# Patient Record
Sex: FEMALE | URBAN - METROPOLITAN AREA
[De-identification: names, ages, dates, MRNs, and addresses within clinical notes are randomized per-mention and may not be internally consistent; named-entity substitution may affect disease eponyms.]

---

## 2021-10-25 ENCOUNTER — INPATIENT (INPATIENT)
Facility: HOSPITAL | Age: 71
LOS: 2 days | Discharge: HOME | DRG: 287 | End: 2021-10-28
Attending: INTERNAL MEDICINE | Admitting: INTERNAL MEDICINE
Payer: MEDICARE

## 2021-10-25 VITALS
HEIGHT: 63 IN | TEMPERATURE: 98 F | RESPIRATION RATE: 18 BRPM | WEIGHT: 175.05 LBS | DIASTOLIC BLOOD PRESSURE: 71 MMHG | HEART RATE: 89 BPM | OXYGEN SATURATION: 95 % | SYSTOLIC BLOOD PRESSURE: 142 MMHG

## 2021-10-25 PROCEDURE — 93010 ELECTROCARDIOGRAM REPORT: CPT

## 2021-10-25 NOTE — ED ADULT TRIAGE NOTE - CHIEF COMPLAINT QUOTE
Had epigastric discomfort, took peptobismal with some relief but then the pain came back and she got concerened

## 2021-10-26 DIAGNOSIS — R07.9 CHEST PAIN, UNSPECIFIED: ICD-10-CM

## 2021-10-26 DIAGNOSIS — Z90.49 ACQUIRED ABSENCE OF OTHER SPECIFIED PARTS OF DIGESTIVE TRACT: Chronic | ICD-10-CM

## 2021-10-26 LAB
ALBUMIN SERPL ELPH-MCNC: 4.5 G/DL — SIGNIFICANT CHANGE UP (ref 3.3–5)
ALP SERPL-CCNC: 103 U/L — SIGNIFICANT CHANGE UP (ref 40–120)
ALT FLD-CCNC: 21 U/L — SIGNIFICANT CHANGE UP (ref 10–45)
ANION GAP SERPL CALC-SCNC: 13 MMOL/L — SIGNIFICANT CHANGE UP (ref 5–17)
AST SERPL-CCNC: 16 U/L — SIGNIFICANT CHANGE UP (ref 10–40)
BASOPHILS # BLD AUTO: 0.02 K/UL — SIGNIFICANT CHANGE UP (ref 0–0.2)
BASOPHILS NFR BLD AUTO: 0.3 % — SIGNIFICANT CHANGE UP (ref 0–2)
BILIRUB SERPL-MCNC: 0.2 MG/DL — SIGNIFICANT CHANGE UP (ref 0.2–1.2)
BUN SERPL-MCNC: 18 MG/DL — SIGNIFICANT CHANGE UP (ref 7–23)
CALCIUM SERPL-MCNC: 10.5 MG/DL — SIGNIFICANT CHANGE UP (ref 8.4–10.5)
CHLORIDE SERPL-SCNC: 102 MMOL/L — SIGNIFICANT CHANGE UP (ref 96–108)
CHOLEST SERPL-MCNC: 169 MG/DL — SIGNIFICANT CHANGE UP
CO2 SERPL-SCNC: 24 MMOL/L — SIGNIFICANT CHANGE UP (ref 22–31)
CREAT SERPL-MCNC: 0.76 MG/DL — SIGNIFICANT CHANGE UP (ref 0.5–1.3)
EOSINOPHIL # BLD AUTO: 0.06 K/UL — SIGNIFICANT CHANGE UP (ref 0–0.5)
EOSINOPHIL NFR BLD AUTO: 1 % — SIGNIFICANT CHANGE UP (ref 0–6)
GLUCOSE SERPL-MCNC: 116 MG/DL — HIGH (ref 70–99)
HCT VFR BLD CALC: 39.1 % — SIGNIFICANT CHANGE UP (ref 34.5–45)
HDLC SERPL-MCNC: 76 MG/DL — SIGNIFICANT CHANGE UP
HGB BLD-MCNC: 12.7 G/DL — SIGNIFICANT CHANGE UP (ref 11.5–15.5)
IMM GRANULOCYTES NFR BLD AUTO: 0.5 % — SIGNIFICANT CHANGE UP (ref 0–1.5)
LIPID PNL WITH DIRECT LDL SERPL: 74 MG/DL — SIGNIFICANT CHANGE UP
LYMPHOCYTES # BLD AUTO: 1.79 K/UL — SIGNIFICANT CHANGE UP (ref 1–3.3)
LYMPHOCYTES # BLD AUTO: 30.4 % — SIGNIFICANT CHANGE UP (ref 13–44)
MCHC RBC-ENTMCNC: 29 PG — SIGNIFICANT CHANGE UP (ref 27–34)
MCHC RBC-ENTMCNC: 32.5 GM/DL — SIGNIFICANT CHANGE UP (ref 32–36)
MCV RBC AUTO: 89.3 FL — SIGNIFICANT CHANGE UP (ref 80–100)
MONOCYTES # BLD AUTO: 0.5 K/UL — SIGNIFICANT CHANGE UP (ref 0–0.9)
MONOCYTES NFR BLD AUTO: 8.5 % — SIGNIFICANT CHANGE UP (ref 2–14)
NEUTROPHILS # BLD AUTO: 3.48 K/UL — SIGNIFICANT CHANGE UP (ref 1.8–7.4)
NEUTROPHILS NFR BLD AUTO: 59.3 % — SIGNIFICANT CHANGE UP (ref 43–77)
NON HDL CHOLESTEROL: 93 MG/DL — SIGNIFICANT CHANGE UP
NRBC # BLD: 0 /100 WBCS — SIGNIFICANT CHANGE UP (ref 0–0)
PLATELET # BLD AUTO: 233 K/UL — SIGNIFICANT CHANGE UP (ref 150–400)
POTASSIUM SERPL-MCNC: 4.3 MMOL/L — SIGNIFICANT CHANGE UP (ref 3.5–5.3)
POTASSIUM SERPL-SCNC: 4.3 MMOL/L — SIGNIFICANT CHANGE UP (ref 3.5–5.3)
PROT SERPL-MCNC: 7.3 G/DL — SIGNIFICANT CHANGE UP (ref 6–8.3)
RBC # BLD: 4.38 M/UL — SIGNIFICANT CHANGE UP (ref 3.8–5.2)
RBC # FLD: 13.3 % — SIGNIFICANT CHANGE UP (ref 10.3–14.5)
SARS-COV-2 RNA SPEC QL NAA+PROBE: SIGNIFICANT CHANGE UP
SODIUM SERPL-SCNC: 139 MMOL/L — SIGNIFICANT CHANGE UP (ref 135–145)
TRIGL SERPL-MCNC: 94 MG/DL — SIGNIFICANT CHANGE UP
TROPONIN T, HIGH SENSITIVITY RESULT: <6 NG/L — SIGNIFICANT CHANGE UP (ref 0–51)
TROPONIN T, HIGH SENSITIVITY RESULT: <6 NG/L — SIGNIFICANT CHANGE UP (ref 0–51)
WBC # BLD: 5.88 K/UL — SIGNIFICANT CHANGE UP (ref 3.8–10.5)
WBC # FLD AUTO: 5.88 K/UL — SIGNIFICANT CHANGE UP (ref 3.8–10.5)

## 2021-10-26 PROCEDURE — 99236 HOSP IP/OBS SAME DATE HI 85: CPT

## 2021-10-26 PROCEDURE — 71046 X-RAY EXAM CHEST 2 VIEWS: CPT | Mod: 26

## 2021-10-26 RX ORDER — ACETAMINOPHEN 500 MG
650 TABLET ORAL EVERY 6 HOURS
Refills: 0 | Status: DISCONTINUED | OUTPATIENT
Start: 2021-10-26 | End: 2021-10-28

## 2021-10-26 RX ORDER — HEPARIN SODIUM 5000 [USP'U]/ML
5000 INJECTION INTRAVENOUS; SUBCUTANEOUS EVERY 12 HOURS
Refills: 0 | Status: DISCONTINUED | OUTPATIENT
Start: 2021-10-26 | End: 2021-10-28

## 2021-10-26 RX ORDER — LISINOPRIL 2.5 MG/1
10 TABLET ORAL DAILY
Refills: 0 | Status: DISCONTINUED | OUTPATIENT
Start: 2021-10-26 | End: 2021-10-28

## 2021-10-26 RX ORDER — ASPIRIN/CALCIUM CARB/MAGNESIUM 324 MG
81 TABLET ORAL DAILY
Refills: 0 | Status: DISCONTINUED | OUTPATIENT
Start: 2021-10-26 | End: 2021-10-28

## 2021-10-26 RX ORDER — METOPROLOL TARTRATE 50 MG
25 TABLET ORAL
Refills: 0 | Status: DISCONTINUED | OUTPATIENT
Start: 2021-10-26 | End: 2021-10-28

## 2021-10-26 RX ORDER — FLUOXETINE HCL 10 MG
40 CAPSULE ORAL DAILY
Refills: 0 | Status: DISCONTINUED | OUTPATIENT
Start: 2021-10-26 | End: 2021-10-28

## 2021-10-26 RX ORDER — FAMOTIDINE 10 MG/ML
20 INJECTION INTRAVENOUS ONCE
Refills: 0 | Status: COMPLETED | OUTPATIENT
Start: 2021-10-26 | End: 2021-10-26

## 2021-10-26 RX ORDER — HYDROCHLOROTHIAZIDE 25 MG
12.5 TABLET ORAL DAILY
Refills: 0 | Status: DISCONTINUED | OUTPATIENT
Start: 2021-10-26 | End: 2021-10-28

## 2021-10-26 RX ORDER — ACETAMINOPHEN 500 MG
650 TABLET ORAL ONCE
Refills: 0 | Status: COMPLETED | OUTPATIENT
Start: 2021-10-26 | End: 2021-10-26

## 2021-10-26 RX ORDER — PANTOPRAZOLE SODIUM 20 MG/1
40 TABLET, DELAYED RELEASE ORAL
Refills: 0 | Status: DISCONTINUED | OUTPATIENT
Start: 2021-10-26 | End: 2021-10-28

## 2021-10-26 RX ORDER — SIMVASTATIN 20 MG/1
40 TABLET, FILM COATED ORAL AT BEDTIME
Refills: 0 | Status: DISCONTINUED | OUTPATIENT
Start: 2021-10-26 | End: 2021-10-28

## 2021-10-26 RX ORDER — CLONAZEPAM 1 MG
0.5 TABLET ORAL THREE TIMES A DAY
Refills: 0 | Status: DISCONTINUED | OUTPATIENT
Start: 2021-10-26 | End: 2021-10-28

## 2021-10-26 RX ADMIN — Medication 12.5 MILLIGRAM(S): at 14:03

## 2021-10-26 RX ADMIN — HEPARIN SODIUM 5000 UNIT(S): 5000 INJECTION INTRAVENOUS; SUBCUTANEOUS at 21:41

## 2021-10-26 RX ADMIN — FAMOTIDINE 20 MILLIGRAM(S): 10 INJECTION INTRAVENOUS at 01:04

## 2021-10-26 RX ADMIN — PANTOPRAZOLE SODIUM 40 MILLIGRAM(S): 20 TABLET, DELAYED RELEASE ORAL at 14:04

## 2021-10-26 RX ADMIN — SIMVASTATIN 40 MILLIGRAM(S): 20 TABLET, FILM COATED ORAL at 21:40

## 2021-10-26 RX ADMIN — Medication 0.5 MILLIGRAM(S): at 21:40

## 2021-10-26 RX ADMIN — Medication 650 MILLIGRAM(S): at 20:55

## 2021-10-26 RX ADMIN — Medication 650 MILLIGRAM(S): at 22:08

## 2021-10-26 RX ADMIN — LISINOPRIL 10 MILLIGRAM(S): 2.5 TABLET ORAL at 14:04

## 2021-10-26 RX ADMIN — Medication 40 MILLIGRAM(S): at 14:04

## 2021-10-26 NOTE — ED PROVIDER NOTE - ATTENDING CONTRIBUTION TO CARE
70 year old female with history of HLD presented to ED with chest pain radiating to the jaw. Currently chest pain free. EKG non-ischemic. Pt well appearing. Will obtain cardiac enzyme, cxr, monitor in ED. CDU for stress and echo if initial work up unrevealing.

## 2021-10-26 NOTE — ED CDU PROVIDER INITIAL DAY NOTE - PROGRESS NOTE DETAILS
CDU PROGRESS NOTE LUIS EDUARDO KINCAID: Unable to reach Cards fellow to discuss case, VM not set up yet. Will continue with ED plan for stress/TTE and discuss w/ Dr. Ellis in am. Endorsed to Dr STANISLAW Gregorio MD, Facep CDU NOTE LUIS EDUARDO Greer: pt resting comfortably, feels well without complaint. NAD VSS. no events on tele.  nuc stress- abnormal. minimal suzan-infarct ischemia. spoke with Cards Dr. Dirk Ellis- admit to Dr. Church, Dr. Perkins to be called for cards and to coordinate cath.   as per Dr. Sánchez- agree with admission CDU NOTE LUIS EDUARDO Grere: pt resting comfortably, feels well without complaint. NAD VSS. no events on tele.  spoke with Dr. Dirk Ellis- pt had Cath 2018- normal coronaries. Agrees with plan for nuc stress and echo

## 2021-10-26 NOTE — H&P ADULT - NSICDXPASTMEDICALHX_GEN_ALL_CORE_FT
PAST MEDICAL HISTORY:  Anxiety     Anxiety and depression     HLD (hyperlipidemia)     HTN (hypertension)

## 2021-10-26 NOTE — ED CDU PROVIDER INITIAL DAY NOTE - MEDICAL DECISION MAKING DETAILS
Dr. Sabra Grace: 70 year old female with history of HLD presented to ED with chest pain radiating to the jaw. Currently chest pain free. EKG non-ischemic. Pt well appearing. Initial cardiac work up unrevealing. Will place in CDU for stress and echo.

## 2021-10-26 NOTE — ED PROVIDER NOTE - PHYSICAL EXAMINATION
gen: well appearing  Mentation: AAO x 3  psych: mood appropriate  HEENT: airway patent, conjunctivae clear bilaterally  Cardio: RRR, no m/r/g  Resp: normal BS b/l  GI: soft/nondistended/nontender  : no CVA tenderness  Lymph/Vasc: no LE edema

## 2021-10-26 NOTE — H&P ADULT - ASSESSMENT
70 f with    Chest pain / abnormal stress test  - ASA  - BB  - Cardiology evaluation  - possible cath    HTN control    Depression  - continue Rx    DVT prophylaxis    Further action as per clinical course     Jayson Church MD pager [phone]

## 2021-10-26 NOTE — ED CDU PROVIDER INITIAL DAY NOTE - OBJECTIVE STATEMENT
70 year old female with a PMHx of HLD, anxiety presenting with chest pain. Began last night while lying in bed. Radiates to the jaw, intermittent, non-exertional. Associated epigastric pain. Denies SOB, nausea, vomiting, fevers. Pt states she has a Cardiologist, Dr. Dirk Ellis and had Cardiac Cath last yr that was reportedly "clean".  In ED, patient had ekg no signs of acute ischemia, troponin x 2 negative, chest x ray no signs of acute pathology. Pt sent to CDU for frequent reeval, vitals q 4hrs, telemetry monitoring TTE and stress.

## 2021-10-26 NOTE — ED ADULT NURSE NOTE - OBJECTIVE STATEMENT
71 yo F pt with PMHx of GERD p/w epigastric/diffuse abd pain over the past two nights. pt has tried Pepto bismol and Nexium with mild relief. pt endorses feeling very stressed lately. pt reports quitting smoking last year. pt reports pain radiates around her left side.  pt is A&Ox4, MAEW, skin is warm dry intact and normal for race, LS CTA, abd minimally tender to palpation in all 4 quadrants, cap refill < 2 seconds, NO JVD, no CVA tenderness.  Pt denies: headache, dizziness, palpitations, cough, SOB, n/v/d, urinary symptoms, fevers, chills, weakness at this time.

## 2021-10-26 NOTE — ED PROVIDER NOTE - OBJECTIVE STATEMENT
70 year old female with a PMHx of HLD, anxiety presenting with chest pain. Begain last night while lying in bed. Radiates to the jaw, intermittent, non-exertional. Associated epigastric pain. Denies SOB, nausea, vomiting, fevers.

## 2021-10-26 NOTE — ED CDU PROVIDER INITIAL DAY NOTE - DETAILS
CHEST PAIN  -TELE  -[facility]  -DASIA EVAL  -TTE and STRESS TEST  -CASE D/W ATTENDING

## 2021-10-26 NOTE — H&P ADULT - NSHPSOCIALHISTORY_GEN_ALL_CORE
Social History:    Marital Status:  (   )    (   ) Single    (   )    ( x )   Occupation:   Lives with: (  ) alone  ( x ) children   (  ) spouse   (  ) parents  (  ) other    Substance Use (street drugs): ( x ) never used  (  ) other:  Tobacco Usage:  (  x ) never smoked   (   ) former smoker   (   ) current smoker  (     ) pack years  (        ) last cigarette date  Alcohol Usage: denies    (     ) Advanced Directives: (     ) None    (      ) DNR    (     ) DNI    (     ) Health Care Proxy:

## 2021-10-26 NOTE — H&P ADULT - NSHPLABSRESULTS_GEN_ALL_CORE
12.7   5.88  )-----------( 233      ( 26 Oct 2021 01:07 )             39.1       10-26    139  |  102  |  18  ----------------------------<  116<H>  4.3   |  24  |  0.76    Ca    10.5      26 Oct 2021 01:07    TPro  7.3  /  Alb  4.5  /  TBili  0.2  /  DBili  x   /  AST  16  /  ALT  21  /  AlkPhos  103  10-26        < from: Transthoracic Echocardiogram (10.26.21 @ 05:33) >    ------------------------------------------------------------------------  Conclusions:  1. Normal mitral valve. Minimal mitral regurgitation.  2. Endocardium not well visualized; grossly normal left  ventricular systolic function.  3. Normal right ventricular size and function.  4. Normal tricuspid valve. Minimal tricuspid regurgitation.  *** No previous Echo exam.    < end of copied text >    IMPRESSIONS:Abnormal Study  * Chest Pain: No chest pain with administration of  Regadenoson.  * Symptom: No Symptoms.  * HR Response: Appropriate.  * BP Response: Appropriate.  * Heart Rhythm: Sinus Rhythm - 71 BPM.  * Baseline ECG: Nonspecific ST-T wave abnormality.  Poor R  wave progression.  * ECG Changes: No significant ischemic ST segment changes  beyond baseline abnormalities.  * Arrhythmia: None.  * Review of raw data shows: Breast attenuation artifact.  * The left ventricle was normal in size.  * There is a small, moderate defect in the basal septal  wall that is mostly fixed suggestive of infarct with  minimal suzan-infarct ischemia.  * There are also mild to moderate defects in the distal  anterior, distal anterolateral, apical, inferior, and  inferolateral walls that are mostly fixed, partially  correct with prone imaging, and demonstrate normal wall  motion suggestive of attenuation artifact.  * Post-stress gated wall motion analysis was performed  (LVEF = 69 %;LVEDV = 64 ml.) revealing hypokinesis of the  basal septal wall with normal overall left ventricular  ejection fraction.< from: Xray Chest 2 Views PA/Lat (10.26.21 @ 00:55) >    FINDINGS/  IMPRESSION:    Left basilar atelectasis. No pleural effusions or pneumothorax. Cardiac silhouette is unremarkable. No acute osseous pathology.    EKG SR NSST/T

## 2021-10-26 NOTE — H&P ADULT - HISTORY OF PRESENT ILLNESS
70 year old female with a PMHx of HLD, anxiety presenting with chest pain. Begain last night while lying in bed. Radiates to the jaw, intermittent, non-exertional. Associated epigastric pain. Denies SOB, nausea, vomiting, fevers. Stress test abnormal.

## 2021-10-26 NOTE — ED CDU PROVIDER DISPOSITION NOTE - NSFOLLOWUPINSTRUCTIONS_ED_ALL_ED_FT
1) Follow-up with your Primary Medical Doctor and Cardiologist. Call today / next business day for prompt follow-up.  2) Return to Emergency room for any worsening or persistent pain, weakness, fever, or any other concerning symptoms.  3) See attached instruction sheets for additional information, including information regarding signs and symptoms to look out for, reasons to seek immediate care and other important instructions.  4) Follow-up with any specialists as discussed / noted as well.

## 2021-10-26 NOTE — ED CDU PROVIDER DISPOSITION NOTE - CLINICAL COURSE
· HPI Objective Statement: 70 year old female with a PMHx of HLD, anxiety presenting with chest pain. Began last night while lying in bed. Radiates to the jaw, intermittent, non-exertional. Associated epigastric pain. Denies SOB, nausea, vomiting, fevers. Pt states she has a Cardiologist, Dr. Dirk Ellis and had Cardiac Cath last yr that was reportedly "clean".  In ED, patient had ekg no signs of acute ischemia, troponin x 2 negative, chest x ray no signs of acute pathology. Pt sent to CDU for frequent reeval, vitals q 4hrs, telemetry monitoring TTE and stress. · HPI Objective Statement: 70 year old female with a PMHx of HLD, anxiety presenting with chest pain. Began last night while lying in bed. Radiates to the jaw, intermittent, non-exertional. Associated epigastric pain. Denies SOB, nausea, vomiting, fevers. Pt states she has a Cardiologist, Dr. Dirk Ellis and had Cardiac Cath last yr that was reportedly "clean".  In ED, patient had ekg no signs of acute ischemia, troponin x 2 negative, chest x ray no signs of acute pathology. Pt sent to CDU for frequent reeval, vitals q 4hrs, telemetry monitoring TTE and stress.  in CDU, no events on tele. TTE- normal. Stress test- abnormal. +minimal suzan-infarct ischemia. spoke with Dr. Ellis- recommend admission to Dr. Church, for Dr. Perkins to be called for cards and cath tomorrow

## 2021-10-26 NOTE — ED CDU PROVIDER DISPOSITION NOTE - ATTENDING CONTRIBUTION TO CARE
Private Physician KRISTINA Miramontes cards  70y female pmh Covid Vaccine+ HTN, HLD, Dc tobacco 1y ago. 1/3 ppd, No dm, cva, coronary artery disease, cancer, Pt comes to ed  c/o chest pain. "sharp traveling pain' Off/on no constant. rad to abd. Took Pepto-bismol without relief. Now w mod improved no nvdc, gi bleeding. "feels like gas/indigestion" Had similar symptoms in past had angiogram two years ago report "ok" PE well developed and well nourished female awake alert normocephalic atraumatic neck supple chest clear anterior & posterior cv no rubs, gallops or murmurs abd soft +bs no mass guarding neruo no focal defects  Ramón Gregorio MD, Facep Private Physician KRISTINA Miramontes cards  70y female pmh Covid Vaccine+ HTN, HLD, Dc tobacco 1y ago. 1/3 ppd, No dm, cva, coronary artery disease, cancer, Pt comes to ed  c/o chest pain. "sharp traveling pain' Off/on no constant. rad to abd. Took Pepto-bismol without relief. Now w mod improved no nvdc, gi bleeding. "feels like gas/indigestion" Had similar symptoms in past had angiogram two years ago report "ok" PE well developed and well nourished female awake alert normocephalic atraumatic neck supple chest clear anterior & posterior cv no rubs, gallops or murmurs abd soft +bs no mass guarding neruo no focal defects  Ramón Gregorio MD, Facep      Nemes - 71yo F w HLD, anxiety presenting with chest pain, radiates to the jaw, intermittent, non-exertional. No other stx. In ED, patient had EKG w no signs of acute ischemia, troponin x 2 negative, chest x ray no signs of acute pathology. Observed in CDU overnight, no events.   Nuc stress- abnormal w minimal suzan-infarct ischemia. spoke with Cards Dr. Dirk Ellis, recommend admission to Dr. Church, Dr. Perkins to be called for cards and to coordinate cath.   Examined at bedside, asymptomatic, VS, exam wnl. Agree w plan/admission for further Cards eval, likely cath. Will admit

## 2021-10-26 NOTE — ED ADULT NURSE REASSESSMENT NOTE - NS ED NURSE REASSESS COMMENT FT1
07.00 Am Received the Pt from  JENNI Troy Pt is Observed for chest pain for Nuclear stress test  . Received the Pt A&OX 4 obeys commands Karla N/V/D fever chills cp SOB   Comfort care & safety measures continued  IV site looks clean & dry no signs of infiltration noted pt denies  pain IV site .  Pt is advised to call for help  call bell with in the reach pt verbalized the understanding .   pending CDU  MD sanchez . GCS 15/15 A&OX 4 PERRLA  size 3 Strong upper & lower extremities steady gait   No facial droop  No Hand Leg drop denies numbness tingling Continue to monitor. Pt is NSR on monitor

## 2021-10-26 NOTE — H&P ADULT - NSHPPHYSICALEXAM_GEN_ALL_CORE
PHYSICAL EXAMINATION:  Vital Signs Last 24 Hrs  T(C): 36.6 (26 Oct 2021 20:01), Max: 36.7 (25 Oct 2021 21:18)  T(F): 97.8 (26 Oct 2021 20:01), Max: 98.1 (25 Oct 2021 21:18)  HR: 72 (26 Oct 2021 20:01) (62 - 89)  BP: 123/66 (26 Oct 2021 20:01) (108/66 - 142/71)  BP(mean): --  RR: 18 (26 Oct 2021 20:01) (16 - 18)  SpO2: 96% (26 Oct 2021 20:01) (95% - 98%)  CAPILLARY BLOOD GLUCOSE          GENERAL: NAD, well-groomed, well-developed  HEAD:  atraumatic, normocephalic  EYES: sclera anicteric  ENMT: mucous membranes moist  NECK: supple, No JVD  CHEST/LUNG: clear to auscultation bilaterally; no rales, rhonchi, or wheezing b/l  HEART: normal S1, S2  ABDOMEN: BS+, soft, ND, NT   EXTREMITIES:  pulses palpable; no clubbing, cyanosis, or edema b/l LEs  NEURO: awake, alert, interactive; moves all extremities  SKIN: no rashes or lesions

## 2021-10-27 LAB
ANION GAP SERPL CALC-SCNC: 15 MMOL/L — SIGNIFICANT CHANGE UP (ref 5–17)
BUN SERPL-MCNC: 19 MG/DL — SIGNIFICANT CHANGE UP (ref 7–23)
CALCIUM SERPL-MCNC: 10 MG/DL — SIGNIFICANT CHANGE UP (ref 8.4–10.5)
CHLORIDE SERPL-SCNC: 103 MMOL/L — SIGNIFICANT CHANGE UP (ref 96–108)
CO2 SERPL-SCNC: 23 MMOL/L — SIGNIFICANT CHANGE UP (ref 22–31)
COVID-19 SPIKE DOMAIN AB INTERP: POSITIVE
COVID-19 SPIKE DOMAIN ANTIBODY RESULT: >250 U/ML — HIGH
CREAT SERPL-MCNC: 0.62 MG/DL — SIGNIFICANT CHANGE UP (ref 0.5–1.3)
GLUCOSE SERPL-MCNC: 101 MG/DL — HIGH (ref 70–99)
HCT VFR BLD CALC: 40.4 % — SIGNIFICANT CHANGE UP (ref 34.5–45)
HCV AB S/CO SERPL IA: 0.19 S/CO — SIGNIFICANT CHANGE UP (ref 0–0.99)
HCV AB SERPL-IMP: SIGNIFICANT CHANGE UP
HGB BLD-MCNC: 13.3 G/DL — SIGNIFICANT CHANGE UP (ref 11.5–15.5)
MCHC RBC-ENTMCNC: 29 PG — SIGNIFICANT CHANGE UP (ref 27–34)
MCHC RBC-ENTMCNC: 32.9 GM/DL — SIGNIFICANT CHANGE UP (ref 32–36)
MCV RBC AUTO: 88.2 FL — SIGNIFICANT CHANGE UP (ref 80–100)
NRBC # BLD: 0 /100 WBCS — SIGNIFICANT CHANGE UP (ref 0–0)
PLATELET # BLD AUTO: 214 K/UL — SIGNIFICANT CHANGE UP (ref 150–400)
POTASSIUM SERPL-MCNC: 3.7 MMOL/L — SIGNIFICANT CHANGE UP (ref 3.5–5.3)
POTASSIUM SERPL-SCNC: 3.7 MMOL/L — SIGNIFICANT CHANGE UP (ref 3.5–5.3)
RBC # BLD: 4.58 M/UL — SIGNIFICANT CHANGE UP (ref 3.8–5.2)
RBC # FLD: 13.4 % — SIGNIFICANT CHANGE UP (ref 10.3–14.5)
SARS-COV-2 IGG+IGM SERPL QL IA: >250 U/ML — HIGH
SARS-COV-2 IGG+IGM SERPL QL IA: POSITIVE
SODIUM SERPL-SCNC: 141 MMOL/L — SIGNIFICANT CHANGE UP (ref 135–145)
WBC # BLD: 4.72 K/UL — SIGNIFICANT CHANGE UP (ref 3.8–10.5)
WBC # FLD AUTO: 4.72 K/UL — SIGNIFICANT CHANGE UP (ref 3.8–10.5)

## 2021-10-27 RX ADMIN — Medication 25 MILLIGRAM(S): at 05:19

## 2021-10-27 RX ADMIN — SIMVASTATIN 40 MILLIGRAM(S): 20 TABLET, FILM COATED ORAL at 23:06

## 2021-10-27 RX ADMIN — HEPARIN SODIUM 5000 UNIT(S): 5000 INJECTION INTRAVENOUS; SUBCUTANEOUS at 05:20

## 2021-10-27 RX ADMIN — Medication 650 MILLIGRAM(S): at 11:34

## 2021-10-27 RX ADMIN — HEPARIN SODIUM 5000 UNIT(S): 5000 INJECTION INTRAVENOUS; SUBCUTANEOUS at 17:35

## 2021-10-27 RX ADMIN — Medication 12.5 MILLIGRAM(S): at 05:19

## 2021-10-27 RX ADMIN — Medication 81 MILLIGRAM(S): at 11:03

## 2021-10-27 RX ADMIN — Medication 0.5 MILLIGRAM(S): at 23:06

## 2021-10-27 RX ADMIN — Medication 40 MILLIGRAM(S): at 11:04

## 2021-10-27 RX ADMIN — PANTOPRAZOLE SODIUM 40 MILLIGRAM(S): 20 TABLET, DELAYED RELEASE ORAL at 05:19

## 2021-10-27 RX ADMIN — LISINOPRIL 10 MILLIGRAM(S): 2.5 TABLET ORAL at 05:19

## 2021-10-27 RX ADMIN — Medication 650 MILLIGRAM(S): at 11:04

## 2021-10-27 RX ADMIN — Medication 25 MILLIGRAM(S): at 17:35

## 2021-10-27 NOTE — CONSULT NOTE ADULT - SUBJECTIVE AND OBJECTIVE BOX
CHIEF COMPLAINT:Patient is a 70y old  Female who presents with a chief complaint of     HISTORY OF PRESENT ILLNESS:    70 female with history as below, presents with chest pain intermittent   had abnormal stress test  no sob  no dizziness  no syncope   no fever  no chills  no palpitation       PAST MEDICAL & SURGICAL HISTORY:  HLD (hyperlipidemia)    Anxiety    Anxiety and depression    HTN (hypertension)    History of cholecystectomy            MEDICATIONS:  aspirin enteric coated 81 milliGRAM(s) Oral daily  heparin   Injectable 5000 Unit(s) SubCutaneous every 12 hours  hydrochlorothiazide 12.5 milliGRAM(s) Oral daily  lisinopril 10 milliGRAM(s) Oral daily  metoprolol tartrate 25 milliGRAM(s) Oral two times a day        acetaminophen     Tablet .. 650 milliGRAM(s) Oral every 6 hours PRN  clonazePAM  Tablet 0.5 milliGRAM(s) Oral three times a day  FLUoxetine 40 milliGRAM(s) Oral daily    pantoprazole    Tablet 40 milliGRAM(s) Oral before breakfast    simvastatin 40 milliGRAM(s) Oral at bedtime        FAMILY HISTORY:  No pertinent family history in first degree relatives        Non-contributory    SOCIAL HISTORY:    No tobacco, drugs or etoh    Allergies    codeine (Unknown)    Intolerances    	    REVIEW OF SYSTEMS:  as above  The rest of the 14 points ROS reviewed and except above they are unremarkable.        PHYSICAL EXAM:  T(C): 36.4 (10-27-21 @ 15:56), Max: 36.7 (10-26-21 @ 19:16)  HR: 61 (10-27-21 @ 15:56) (60 - 72)  BP: 108/64 (10-27-21 @ 15:56) (100/68 - 126/79)  RR: 18 (10-27-21 @ 15:56) (18 - 18)  SpO2: 94% (10-27-21 @ 15:56) (92% - 96%)  Wt(kg): --  I&O's Summary    27 Oct 2021 07:01  -  27 Oct 2021 16:13  --------------------------------------------------------  IN: 360 mL / OUT: 0 mL / NET: 360 mL        JVP: Normal  Neck: supple  Lung: clear   CV: S1 S2 , Murmur:  Abd: soft  Ext: No edema  neuro: Awake / alert  Psych: flat affect  Skin: normal      LABS/DATA:    TELEMETRY: 	  sinus   ECG:  	   	  CARDIAC MARKERS:                        <6 <<== 10-26-21 @ 03:56                <6 <<== 10-26-21 @ 01:07        < from: Transthoracic Echocardiogram (10.26.21 @ 05:33) >  -------------------------------  Conclusions:  1. Normal mitral valve. Minimal mitral regurgitation.  2. Endocardium not well visualized; grossly normal left  ventricular systolic function.  3. Normal right ventricular size and function.  4. Normal tricuspid valve. Minimal tricuspid regurgitation.  *** No previous Echo exam.  ------------------------------------------------------------------------  Confirmed on  10/26/2021 - 14:43:51 by SEDRICK Alba    < end of copied text >  < from: Nuclear Stress Test-Pharmacologic (10.26.21 @ 16:25) >  IMPRESSIONS:Abnormal Study  * Chest Pain: No chest pain with administration of  Regadenoson.  * Symptom: No Symptoms.  * HR Response: Appropriate.  * BP Response: Appropriate.  * Heart Rhythm: Sinus Rhythm - 71 BPM.  * Baseline ECG: Nonspecific ST-T wave abnormality.  Poor R  wave progression.  * ECG Changes: No significant ischemic ST segment changes  beyond baseline abnormalities.  * Arrhythmia: None.  * Review of raw data shows: Breast attenuation artifact.  * The left ventricle was normal in size.  * There is a small, moderate defect in the basal septal  wall that is mostly fixed suggestive of infarct with  minimal suzan-infarct ischemia.  * There are also mild to moderate defects in the distal  anterior, distal anterolateral, apical, inferior, and  inferolateral walls that are mostly fixed, partially  correct with prone imaging, and demonstrate normal wall  motion suggestive of attenuation artifact.  * Post-stress gated wall motion analysis was performed  (LVEF = 69 %;LVEDV = 64 ml.) revealing hypokinesis of the  basal septal wall with normal overall left ventricular  ejection fraction.  *** No previous Nuclear/Stress exam.  ------------------------------------------------------------------------  Confirmed on  10/26/2021 - 17:17:27 by Eugene Hawkins M.D.    < end of copied text >                        13.3   4.72  )-----------( 214      ( 27 Oct 2021 07:10 )             40.4     10-27    141  |  103  |  19  ----------------------------<  101<H>  3.7   |  23  |  0.62    Ca    10.0      27 Oct 2021 07:10    TPro  7.3  /  Alb  4.5  /  TBili  0.2  /  DBili  x   /  AST  16  /  ALT  21  /  AlkPhos  103  10-26    proBNP: Serum Pro-Brain Natriuretic Peptide: 141 pg/mL (10-26 @ 01:07)    Lipid Profile:   HgA1c:   TSH:

## 2021-10-27 NOTE — CONSULT NOTE ADULT - ASSESSMENT
Chest pain   abnormal stress test   ruled out for acute MI   will plan for cath tomorrow    HTN  cont current meds     HTN  on statin      Advanced care planning was discussed with patient and family.  Risks, benefits and alternatives of the cardiac treatments and medical therapy including procedures were discussed in detail and all questions were answered. Importance of compliance with medical therapy and lifestyle modification to improve cardiovascular health were addressed. Appropriate forms and patient educational materials were reviewed. 30 minutes face to face spent.

## 2021-10-27 NOTE — PROGRESS NOTE ADULT - ASSESSMENT
70 f with    Chest pain / abnormal stress test  - ASA  - BB  - Cardiology evaluation noted  - cath pending     HTN control    Depression  - continue Rx    DVT prophylaxis    Jayson Church MD pager [phone]

## 2021-10-28 VITALS
SYSTOLIC BLOOD PRESSURE: 100 MMHG | OXYGEN SATURATION: 97 % | DIASTOLIC BLOOD PRESSURE: 63 MMHG | HEART RATE: 73 BPM | RESPIRATION RATE: 16 BRPM | TEMPERATURE: 98 F

## 2021-10-28 LAB
ANION GAP SERPL CALC-SCNC: 14 MMOL/L — SIGNIFICANT CHANGE UP (ref 5–17)
BUN SERPL-MCNC: 20 MG/DL — SIGNIFICANT CHANGE UP (ref 7–23)
CALCIUM SERPL-MCNC: 9.7 MG/DL — SIGNIFICANT CHANGE UP (ref 8.4–10.5)
CHLORIDE SERPL-SCNC: 102 MMOL/L — SIGNIFICANT CHANGE UP (ref 96–108)
CO2 SERPL-SCNC: 23 MMOL/L — SIGNIFICANT CHANGE UP (ref 22–31)
CREAT SERPL-MCNC: 0.73 MG/DL — SIGNIFICANT CHANGE UP (ref 0.5–1.3)
GLUCOSE SERPL-MCNC: 99 MG/DL — SIGNIFICANT CHANGE UP (ref 70–99)
HCT VFR BLD CALC: 40.6 % — SIGNIFICANT CHANGE UP (ref 34.5–45)
HGB BLD-MCNC: 13.3 G/DL — SIGNIFICANT CHANGE UP (ref 11.5–15.5)
MAGNESIUM SERPL-MCNC: 1.9 MG/DL — SIGNIFICANT CHANGE UP (ref 1.6–2.6)
MCHC RBC-ENTMCNC: 28.9 PG — SIGNIFICANT CHANGE UP (ref 27–34)
MCHC RBC-ENTMCNC: 32.8 GM/DL — SIGNIFICANT CHANGE UP (ref 32–36)
MCV RBC AUTO: 88.3 FL — SIGNIFICANT CHANGE UP (ref 80–100)
NRBC # BLD: 0 /100 WBCS — SIGNIFICANT CHANGE UP (ref 0–0)
PHOSPHATE SERPL-MCNC: 3.4 MG/DL — SIGNIFICANT CHANGE UP (ref 2.5–4.5)
PLATELET # BLD AUTO: 205 K/UL — SIGNIFICANT CHANGE UP (ref 150–400)
POTASSIUM SERPL-MCNC: 3.8 MMOL/L — SIGNIFICANT CHANGE UP (ref 3.5–5.3)
POTASSIUM SERPL-SCNC: 3.8 MMOL/L — SIGNIFICANT CHANGE UP (ref 3.5–5.3)
RBC # BLD: 4.6 M/UL — SIGNIFICANT CHANGE UP (ref 3.8–5.2)
RBC # FLD: 13.3 % — SIGNIFICANT CHANGE UP (ref 10.3–14.5)
SODIUM SERPL-SCNC: 139 MMOL/L — SIGNIFICANT CHANGE UP (ref 135–145)
WBC # BLD: 4.86 K/UL — SIGNIFICANT CHANGE UP (ref 3.8–10.5)
WBC # FLD AUTO: 4.86 K/UL — SIGNIFICANT CHANGE UP (ref 3.8–10.5)

## 2021-10-28 PROCEDURE — A9500: CPT

## 2021-10-28 PROCEDURE — 99152 MOD SED SAME PHYS/QHP 5/>YRS: CPT

## 2021-10-28 PROCEDURE — C1887: CPT

## 2021-10-28 PROCEDURE — C1894: CPT

## 2021-10-28 PROCEDURE — C1769: CPT

## 2021-10-28 PROCEDURE — 99285 EMERGENCY DEPT VISIT HI MDM: CPT

## 2021-10-28 PROCEDURE — 85027 COMPLETE CBC AUTOMATED: CPT

## 2021-10-28 PROCEDURE — 83036 HEMOGLOBIN GLYCOSYLATED A1C: CPT

## 2021-10-28 PROCEDURE — U0005: CPT

## 2021-10-28 PROCEDURE — 83690 ASSAY OF LIPASE: CPT

## 2021-10-28 PROCEDURE — 83880 ASSAY OF NATRIURETIC PEPTIDE: CPT

## 2021-10-28 PROCEDURE — 84100 ASSAY OF PHOSPHORUS: CPT

## 2021-10-28 PROCEDURE — 93306 TTE W/DOPPLER COMPLETE: CPT

## 2021-10-28 PROCEDURE — 86769 SARS-COV-2 COVID-19 ANTIBODY: CPT

## 2021-10-28 PROCEDURE — 93454 CORONARY ARTERY ANGIO S&I: CPT | Mod: 26

## 2021-10-28 PROCEDURE — 83735 ASSAY OF MAGNESIUM: CPT

## 2021-10-28 PROCEDURE — U0003: CPT

## 2021-10-28 PROCEDURE — 93017 CV STRESS TEST TRACING ONLY: CPT

## 2021-10-28 PROCEDURE — 93005 ELECTROCARDIOGRAM TRACING: CPT

## 2021-10-28 PROCEDURE — 78452 HT MUSCLE IMAGE SPECT MULT: CPT | Mod: MA

## 2021-10-28 PROCEDURE — 86803 HEPATITIS C AB TEST: CPT

## 2021-10-28 PROCEDURE — 80048 BASIC METABOLIC PNL TOTAL CA: CPT

## 2021-10-28 PROCEDURE — 96374 THER/PROPH/DIAG INJ IV PUSH: CPT

## 2021-10-28 PROCEDURE — 84484 ASSAY OF TROPONIN QUANT: CPT

## 2021-10-28 PROCEDURE — 80061 LIPID PANEL: CPT

## 2021-10-28 PROCEDURE — G0378: CPT

## 2021-10-28 PROCEDURE — 80053 COMPREHEN METABOLIC PANEL: CPT

## 2021-10-28 PROCEDURE — 85025 COMPLETE CBC W/AUTO DIFF WBC: CPT

## 2021-10-28 PROCEDURE — 71046 X-RAY EXAM CHEST 2 VIEWS: CPT

## 2021-10-28 PROCEDURE — 93454 CORONARY ARTERY ANGIO S&I: CPT

## 2021-10-28 RX ORDER — METOPROLOL TARTRATE 50 MG
1 TABLET ORAL
Qty: 60 | Refills: 0
Start: 2021-10-28 | End: 2021-11-26

## 2021-10-28 RX ORDER — ASPIRIN/CALCIUM CARB/MAGNESIUM 324 MG
1 TABLET ORAL
Qty: 30 | Refills: 0
Start: 2021-10-28 | End: 2021-11-26

## 2021-10-28 RX ADMIN — Medication 40 MILLIGRAM(S): at 14:24

## 2021-10-28 RX ADMIN — HEPARIN SODIUM 5000 UNIT(S): 5000 INJECTION INTRAVENOUS; SUBCUTANEOUS at 06:03

## 2021-10-28 RX ADMIN — Medication 12.5 MILLIGRAM(S): at 06:01

## 2021-10-28 RX ADMIN — Medication 81 MILLIGRAM(S): at 09:12

## 2021-10-28 RX ADMIN — PANTOPRAZOLE SODIUM 40 MILLIGRAM(S): 20 TABLET, DELAYED RELEASE ORAL at 06:02

## 2021-10-28 RX ADMIN — Medication 25 MILLIGRAM(S): at 06:02

## 2021-10-28 RX ADMIN — LISINOPRIL 10 MILLIGRAM(S): 2.5 TABLET ORAL at 06:01

## 2021-10-28 NOTE — DISCHARGE NOTE PROVIDER - NSDCMRMEDTOKEN_GEN_ALL_CORE_FT
aspirin 81 mg oral delayed release tablet: 1 tab(s) orally once a day  clonazePAM 0.5 mg oral tablet: 1 tab(s) orally 3 times a day, As Needed  FLUoxetine 40 mg oral capsule: 1 cap(s) orally once a day  lisinopril-hydrochlorothiazide 10 mg-12.5 mg oral tablet: 1 tab(s) orally once a day  metoprolol tartrate 25 mg oral tablet: 1 tab(s) orally 2 times a day  omeprazole 40 mg oral delayed release capsule: 1 cap(s) orally once a day  simvastatin 40 mg oral tablet: 1 tab(s) orally once a day (at bedtime)  Tylenol 325 mg oral tablet: 1 tab(s) orally , As Needed

## 2021-10-28 NOTE — PROGRESS NOTE ADULT - ASSESSMENT
70 f with    Chest pain / abnormal stress test  - ASA  - BB  - Cardiology follow. Cleared for DC  - cath nonobstructive.    HTN control    Depression  - continue Rx    DVT prophylaxis    DC home. Follow with PMD/ Cardiology in 3-4 days. QA    Jayson Church MD pager [phone]

## 2021-10-28 NOTE — PROGRESS NOTE ADULT - ASSESSMENT
Chest pain   abnormal stress test   ruled out for acute MI   cath non obstructive     HTN  cont current meds     HTN  on statin      Advanced care planning was discussed with patient and family.  Risks, benefits and alternatives of the cardiac treatments and medical therapy including procedures were discussed in detail and all questions were answered. Importance of compliance with medical therapy and lifestyle modification to improve cardiovascular health were addressed. Appropriate forms and patient educational materials were reviewed. 30 minutes face to face spent.

## 2021-10-28 NOTE — DISCHARGE NOTE PROVIDER - NSDCCPCAREPLAN_GEN_ALL_CORE_FT
PRINCIPAL DISCHARGE DIAGNOSIS  Diagnosis: Chest pain  Assessment and Plan of Treatment: Improved.  Cardiac cath with normal coronaries.  Follow up with your PCP within one week of discharge to discuss your admission.  Please call to schedule your appointment.      SECONDARY DISCHARGE DIAGNOSES  Diagnosis: Hypertension  Assessment and Plan of Treatment: Take your BP medications as prescribed.    Diagnosis: Anxiety with depression  Assessment and Plan of Treatment: Take your medications as prescribed.

## 2021-10-28 NOTE — PROGRESS NOTE ADULT - SUBJECTIVE AND OBJECTIVE BOX
Patient is a 70y old  Female who presents with a chief complaint of     SUBJECTIVE / OVERNIGHT EVENTS: Comfortable without new complaints.   Review of Systems  chest pain no  palpitations no  sob no  nausea no  headache no    MEDICATIONS  (STANDING):  aspirin enteric coated 81 milliGRAM(s) Oral daily  clonazePAM  Tablet 0.5 milliGRAM(s) Oral three times a day  FLUoxetine 40 milliGRAM(s) Oral daily  heparin   Injectable 5000 Unit(s) SubCutaneous every 12 hours  hydrochlorothiazide 12.5 milliGRAM(s) Oral daily  lisinopril 10 milliGRAM(s) Oral daily  metoprolol tartrate 25 milliGRAM(s) Oral two times a day  pantoprazole    Tablet 40 milliGRAM(s) Oral before breakfast  simvastatin 40 milliGRAM(s) Oral at bedtime    MEDICATIONS  (PRN):  acetaminophen     Tablet .. 650 milliGRAM(s) Oral every 6 hours PRN Temp greater or equal to 38.5C (101.3F), Mild Pain (1 - 3)      Vital Signs Last 24 Hrs  T(C): 36.4 (27 Oct 2021 15:56), Max: 36.7 (27 Oct 2021 04:34)  T(F): 97.5 (27 Oct 2021 15:56), Max: 98.1 (27 Oct 2021 04:34)  HR: 74 (27 Oct 2021 17:30) (60 - 74)  BP: 110/65 (27 Oct 2021 17:30) (100/68 - 126/79)  BP(mean): --  RR: 18 (27 Oct 2021 15:56) (18 - 18)  SpO2: 94% (27 Oct 2021 15:56) (92% - 94%)    PHYSICAL EXAM:  GENERAL: NAD, well-developed  HEAD:  Atraumatic, Normocephalic  EYES: EOMI, PERRLA, conjunctiva and sclera clear  NECK: Supple, No JVD  CHEST/LUNG: Clear to auscultation bilaterally; No wheeze  HEART: Regular rate and rhythm; No murmurs, rubs, or gallops  ABDOMEN: Soft, Nontender, Nondistended; Bowel sounds present  EXTREMITIES:  2+ Peripheral Pulses, No clubbing, cyanosis, or edema  PSYCH: AAOx3  NEUROLOGY: non-focal  SKIN: No rashes or lesions    LABS:                        13.3   4.72  )-----------( 214      ( 27 Oct 2021 07:10 )             40.4     10-27    141  |  103  |  19  ----------------------------<  101<H>  3.7   |  23  |  0.62    Ca    10.0      27 Oct 2021 07:10    TPro  7.3  /  Alb  4.5  /  TBili  0.2  /  DBili  x   /  AST  16  /  ALT  21  /  AlkPhos  103  10-26                RADIOLOGY & ADDITIONAL TESTS:    Imaging Personally Reviewed:    Consultant(s) Notes Reviewed:      Care Discussed with Consultants/Other Providers:  
DATE OF SERVICE: 10-28-21 @ 10:23    Subjective: Patient seen and examined. No new events except as noted.     SUBJECTIVE/ROS:  No chest pain, dyspnea, palpitation, or dizziness.       MEDICATIONS:  MEDICATIONS  (STANDING):  aspirin enteric coated 81 milliGRAM(s) Oral daily  clonazePAM  Tablet 0.5 milliGRAM(s) Oral three times a day  FLUoxetine 40 milliGRAM(s) Oral daily  heparin   Injectable 5000 Unit(s) SubCutaneous every 12 hours  hydrochlorothiazide 12.5 milliGRAM(s) Oral daily  lisinopril 10 milliGRAM(s) Oral daily  metoprolol tartrate 25 milliGRAM(s) Oral two times a day  pantoprazole    Tablet 40 milliGRAM(s) Oral before breakfast  simvastatin 40 milliGRAM(s) Oral at bedtime      PHYSICAL EXAM:  T(C): 36.3 (10-28-21 @ 10:10), Max: 36.6 (10-28-21 @ 00:35)  HR: 63 (10-28-21 @ 10:10) (56 - 74)  BP: 96/77 (10-28-21 @ 10:10) (96/77 - 114/70)  RR: 16 (10-28-21 @ 10:10) (16 - 18)  SpO2: 96% (10-28-21 @ 10:10) (94% - 96%)  Wt(kg): --  I&O's Summary    27 Oct 2021 07:01  -  28 Oct 2021 07:00  --------------------------------------------------------  IN: 360 mL / OUT: 0 mL / NET: 360 mL            JVP: Normal  Neck: supple  Lung: clear   CV: S1 S2 , Murmur:  Abd: soft  Ext: No edema  neuro: Awake / alert  Psych: flat affect  Skin: normal``    LABS/DATA:    CARDIAC MARKERS:                                13.3   4.86  )-----------( 205      ( 28 Oct 2021 06:57 )             40.6     10-28    139  |  102  |  20  ----------------------------<  99  3.8   |  23  |  0.73    Ca    9.7      28 Oct 2021 06:57  Phos  3.4     10-28  Mg     1.9     10-28      proBNP:   Lipid Profile:   HgA1c:   TSH:     TELE:  EKG:        
Patient is a 70y old  Female who presents with a chief complaint of Chest pain (28 Oct 2021 14:07)      SUBJECTIVE / OVERNIGHT EVENTS: Comfortable without new complaints.   Review of Systems  chest pain no  palpitations no  sob no  nausea no  headache no    MEDICATIONS  (STANDING):  aspirin enteric coated 81 milliGRAM(s) Oral daily  clonazePAM  Tablet 0.5 milliGRAM(s) Oral three times a day  FLUoxetine 40 milliGRAM(s) Oral daily  heparin   Injectable 5000 Unit(s) SubCutaneous every 12 hours  hydrochlorothiazide 12.5 milliGRAM(s) Oral daily  lisinopril 10 milliGRAM(s) Oral daily  metoprolol tartrate 25 milliGRAM(s) Oral two times a day  pantoprazole    Tablet 40 milliGRAM(s) Oral before breakfast  simvastatin 40 milliGRAM(s) Oral at bedtime    MEDICATIONS  (PRN):  acetaminophen     Tablet .. 650 milliGRAM(s) Oral every 6 hours PRN Temp greater or equal to 38.5C (101.3F), Mild Pain (1 - 3)      Vital Signs Last 24 Hrs  T(C): 36.4 (28 Oct 2021 14:57), Max: 36.6 (28 Oct 2021 00:35)  T(F): 97.5 (28 Oct 2021 14:57), Max: 97.8 (28 Oct 2021 00:35)  HR: 73 (28 Oct 2021 14:57) (56 - 73)  BP: 100/63 (28 Oct 2021 14:57) (93/58 - 114/70)  BP(mean): 61 (28 Oct 2021 12:10) (61 - 81)  RR: 16 (28 Oct 2021 14:57) (15 - 18)  SpO2: 97% (28 Oct 2021 14:57) (93% - 99%)    PHYSICAL EXAM:  GENERAL: NAD, well-developed  HEAD:  Atraumatic, Normocephalic  EYES: EOMI, PERRLA, conjunctiva and sclera clear  NECK: Supple, No JVD  CHEST/LUNG: Clear to auscultation bilaterally; No wheeze  HEART: Regular rate and rhythm; No murmurs, rubs, or gallops  ABDOMEN: Soft, Nontender, Nondistended; Bowel sounds present  EXTREMITIES:  2+ Peripheral Pulses, No clubbing, cyanosis, or edema  PSYCH: AAOx3  NEUROLOGY: non-focal  SKIN: No rashes or lesions    LABS:                        13.3   4.86  )-----------( 205      ( 28 Oct 2021 06:57 )             40.6     10-28    139  |  102  |  20  ----------------------------<  99  3.8   |  23  |  0.73    Ca    9.7      28 Oct 2021 06:57  Phos  3.4     10-28  Mg     1.9     10-28          Cath nonobstructive         RADIOLOGY & ADDITIONAL TESTS:    Imaging Personally Reviewed:    Consultant(s) Notes Reviewed:      Care Discussed with Consultants/Other Providers:

## 2021-10-28 NOTE — PROVIDER CONTACT NOTE (CHANGE IN STATUS NOTIFICATION) - SITUATION
Patient refusing 0600 Klonopin PO.  Patient educated on the benefits of medication.  PA made aware.  Dose wasted with RN witness in Pyxis.

## 2021-10-28 NOTE — DISCHARGE NOTE PROVIDER - CARE PROVIDER_API CALL
BERNADETTE NOLAND  Cardiovascular Diseases  [address] [place] [address] [address]  [place] [address]  Phone: [phone]  Fax: [phone]  Follow Up Time:

## 2021-10-28 NOTE — DISCHARGE NOTE PROVIDER - HOSPITAL COURSE
70 f with PMH of HLD and anxiety present with Chest pain.  Underwent stress testing which was abnormal.  She is now s/p cardiac cath on 10/28/21 with normal coronaries.  Medically cleared for d/c home with follow up to PCP.

## 2021-10-28 NOTE — DISCHARGE NOTE NURSING/CASE MANAGEMENT/SOCIAL WORK - NSTOBACCONEVERSMOKERY/N_GEN_A
Finished the antibiotic, all symptoms resolved except still having painful urination.  Wondering what you recommend next?    Yes

## 2021-10-29 RX ORDER — PREGABALIN 225 MG/1
1 CAPSULE ORAL
Qty: 0 | Refills: 0 | DISCHARGE

## 2021-10-29 RX ORDER — CHOLECALCIFEROL (VITAMIN D3) 125 MCG
1 CAPSULE ORAL
Qty: 0 | Refills: 0 | DISCHARGE

## 2021-10-29 RX ORDER — SIMVASTATIN 20 MG/1
1 TABLET, FILM COATED ORAL
Qty: 0 | Refills: 0 | DISCHARGE

## 2021-10-29 RX ORDER — ACETAMINOPHEN 500 MG
1 TABLET ORAL
Qty: 0 | Refills: 0 | DISCHARGE

## 2021-10-29 RX ORDER — LISINOPRIL/HYDROCHLOROTHIAZIDE 10-12.5 MG
1 TABLET ORAL
Qty: 0 | Refills: 0 | DISCHARGE

## 2021-10-29 RX ORDER — FLUOXETINE HCL 10 MG
1 CAPSULE ORAL
Qty: 0 | Refills: 0 | DISCHARGE

## 2021-10-29 RX ORDER — CLONAZEPAM 1 MG
1 TABLET ORAL
Qty: 0 | Refills: 0 | DISCHARGE

## 2021-10-29 RX ORDER — OMEPRAZOLE 10 MG/1
1 CAPSULE, DELAYED RELEASE ORAL
Qty: 0 | Refills: 0 | DISCHARGE

## 2021-12-07 NOTE — DISCHARGE NOTE NURSING/CASE MANAGEMENT/SOCIAL WORK - PATIENT PORTAL LINK FT
Yes
You can access the FollowMyHealth Patient Portal offered by [facility] by registering at the following website: [email or web address]. By joining [employer]’s FollowMyHealth portal, you will also be able to view your health information using other applications (apps) compatible with our system.

## 2022-06-09 NOTE — ED PROVIDER NOTE - WR ORDER DATE AND TIME 1
Form has been received.
[employer] calling to see if form was received for diabetic supplies. Says they sent last week and again today.
26-Oct-2021 00:44

## 2022-09-01 PROBLEM — E78.5 HYPERLIPIDEMIA, UNSPECIFIED: Chronic | Status: ACTIVE | Noted: 2021-10-26

## 2022-09-01 PROBLEM — F41.9 ANXIETY DISORDER, UNSPECIFIED: Chronic | Status: ACTIVE | Noted: 2021-10-26

## 2022-09-01 PROBLEM — I10 ESSENTIAL (PRIMARY) HYPERTENSION: Chronic | Status: ACTIVE | Noted: 2021-10-26

## 2022-09-09 PROBLEM — Z00.00 ENCOUNTER FOR PREVENTIVE HEALTH EXAMINATION: Status: ACTIVE | Noted: 2022-09-09

## 2022-09-18 ENCOUNTER — NON-APPOINTMENT (OUTPATIENT)
Age: 72
End: 2022-09-18

## 2022-09-19 ENCOUNTER — APPOINTMENT (OUTPATIENT)
Dept: VASCULAR SURGERY | Facility: CLINIC | Age: 72
End: 2022-09-19

## 2022-09-19 ENCOUNTER — NON-APPOINTMENT (OUTPATIENT)
Age: 72
End: 2022-09-19

## 2022-09-19 VITALS
HEART RATE: 80 BPM | SYSTOLIC BLOOD PRESSURE: 122 MMHG | BODY MASS INDEX: 32.2 KG/M2 | WEIGHT: 175 LBS | DIASTOLIC BLOOD PRESSURE: 79 MMHG | TEMPERATURE: 98 F | HEIGHT: 62 IN

## 2022-09-19 PROCEDURE — 99204 OFFICE O/P NEW MOD 45 MIN: CPT

## 2022-09-19 PROCEDURE — 93970 EXTREMITY STUDY: CPT

## 2022-09-19 NOTE — DATA REVIEWED
[FreeTextEntry1] : 9/19/2022 Venous Doppler Benigno LE  no acute dvt svt \par                            RLE GSV insuff from sfj to knee  level sig for small caliber \par                            LLE GSV insuff from sfj  to distal calf level w insuff thigh and calf collaterals\par \par

## 2022-09-19 NOTE — ASSESSMENT
[FreeTextEntry1] : Impression  symptomatic venous insuff \par \par \par Plan Med Conservative management leg elevation, knee high compression stockings 15-20mm Hg (rx given), wt loss, diet control, exercise program, protective measures\par Indications risks and benefits of  LLE GSV  RFA , Benigno leg stab phlebectomy were explained to pt who understands\par Pt wants to think about it and decide\par telehealth visit in 2-3 mo to re eval \par \par \par \par Varicose veins are enlarged, twisted veins. Varicose veins are caused by increased blood pressure in the veins.  The blood moves towards the heart by 1-way valves in the veins. When the valves become weakened or damaged, blood can collect in the veins and pool in your lower legs (ankles). This causes the veins to become enlarged and incompetent with reflux. Sitting or standing for long periods can cause blood to pool in the leg veins, increasing the pressure within the veins. \par Risk factors for varicose veins or venous disease may include:  obesity, older age, standing or sitting for prolonged periods of time for several years, being female, pregnancy, taking oral contraceptive pills or hormone replacement, being inactive, and/or smoking. \par The most common symptoms of varicose veins are sensations in the legs, such as a heavy feeling, burning, and/or aching. However, each individual may experience symptoms differently.  Other symptoms may include:  color changes in the skin, sores on the legs, or rash.  Severe varicose veins or venous disease may eventually produce long-term mild swelling that can result in more serious skin and tissue problems, such as ulcers and non-healing sores.\par Varicose veins and venous disease are diagnosed by a complete medical history, physical examination, and diagnostic studies for varicose veins including duplex ultrasound and color-flow imaging.  \par Medical treatment for varicose veins and venous disease include:  compression stockings, sclerotherapy, endovenous ablation and/or surgical treatment with microphlebectomy.  \par \par \par  [Arterial/Venous Disease] : arterial/venous disease [Other: _____] : [unfilled]

## 2022-09-19 NOTE — PHYSICAL EXAM
[Normal Breath Sounds] : Normal breath sounds [1+] : left 1+ [2+] : left 2+ [Alert] : alert [Oriented to Person] : oriented to person [Oriented to Place] : oriented to place [Oriented to Time] : oriented to time [Calm] : calm [JVD] : no jugular venous distention  [0] : right 0 [Abdomen Masses] : No abdominal masses [de-identified] : nad [de-identified] : wnl [FreeTextEntry1] : Moderate bilateral leg venous insufficiency \par w moderate bilateral leg stasis dermatitis, hyperpigmentation in the gator area,hyperkeratosis (skin thickening)\par and mild to moderate bilateral leg edema \par Multiple  bilateral leg small to medium varicose  veins and spider veins  ant post medial calf and shin \par RLE Varicose veins measuring 1-3  mm in size on the calf /shin \par LLE Varicose veins measuring  1-6 mm in size on the sig on calf /shin \par no wounds/ulcers\par  [de-identified] : wnl [de-identified] : Benigno Cranial nerves 2-12 benigno grossly intact [de-identified] : cooperative

## 2022-09-19 NOTE — HISTORY OF PRESENT ILLNESS
[FreeTextEntry1] : Pt c/o bilateral leg pain swelling heaviness and discomfort worse w activity and by the end of the day\par Onset sev years ago \par Intensity  mod to severe\par Pt denies recent injury, travel or thrombophilic event\par \par Pt is s/p jame le venous insuff interventions  by Dr KRISTINA Duff MD previously\par pt is not complaint w comp stockings\par \par Pt denies any recent  cardiac c/o , SOB, Chest Pain or recent heart attacks \par \par \par \par \par

## 2022-09-28 ENCOUNTER — APPOINTMENT (OUTPATIENT)
Dept: VASCULAR SURGERY | Facility: CLINIC | Age: 72
End: 2022-09-28

## 2022-09-28 PROCEDURE — 36475 ENDOVENOUS RF 1ST VEIN: CPT | Mod: LT

## 2022-09-28 RX ORDER — LIDOCAINE HYDROCHLORIDE 10 MG/ML
1 INJECTION, SOLUTION INFILTRATION; PERINEURAL
Qty: 50 | Refills: 0 | Status: COMPLETED | COMMUNITY
Start: 2022-09-21 | End: 2022-09-28

## 2022-09-28 RX ORDER — SODIUM CHLORIDE 9 G/ML
0.9 INJECTION, SOLUTION INTRAVENOUS
Qty: 500 | Refills: 0 | Status: COMPLETED | COMMUNITY
Start: 2022-09-21 | End: 2022-09-28

## 2022-09-28 RX ORDER — SODIUM BICARBONATE 84 MG/ML
8.4 INJECTION, SOLUTION INTRAVENOUS
Qty: 50 | Refills: 0 | Status: COMPLETED | COMMUNITY
Start: 2022-09-21 | End: 2022-09-28

## 2022-09-28 NOTE — PROCEDURE
[FreeTextEntry1] : left GSV RFA [FreeTextEntry3] : Procedural safety checklist and time out completed:\par Confirmed patient identification (Patient Name, , and/or medical record number including when possible affirmation by patient or parent/family/other).\par Confirmed procedure with the patient. Consent present, accurate and signed. \par Confirmed special equipment and supplies are present.\par Sterility confirmed. Position verified. \par Site/ side is marked and visible and confirmed. \par Procedure confirmed by consent. Accurate consent including side and site.\par Review of medical records, including venous ultrasound, noting correct procedure including site and side.\par MD/PA verifies presence and review of imaging studies and or written report of imaging studies.\par Agreement on the procedure to be performed\par Time out completed.\par All of the above has been confirmed by the team.\par All patient-specific concerns have been addressed. \par \par Indication: left lower extremity varicose veins with inflammation, leg pain, leg swelling, and leg cramping.  Venous insufficiency/ reflux.\par \par Procedure: radiofrequency ablation of the left great saphenous vein. \par 	\par Ms. THOMAS GREEN is a 71 year old F with a history of left lower extremity varicose veins previously seen in the office.  Ultrasound examination demonstrated venous insufficiency. A trial of compression stockings, exercise, elevation, and pain medication was attempted without relief and definitive treatment with radiofrequency ablation was offered. \par \par The patient has come for radiofrequency ablation treatment of the left great saphenous vein.\par I have discussed the risks of the procedure at length with the patient. The risks discussed were inclusive of but not limited to infection, irritation at the site of infiltration of local anesthesia, and also rare risk of deep venous thrombosis and pulmonary emboli. The patient agrees to proceed with the procedure. \par The patient was escorted into the procedure room and a time out called.\par The entire limb was prepped and draped in sterile fashion. The RF fiber was placed on the sterile field and connected by a sterile cable. Actuation, temperature, and impedance testing were performed to ensure that all components were connected and operating properly. \par The patient was placed on the procedure table and local anesthesia was instilled in the skin overlying the access site. Under ultrasound guidance, the vein was punctured with a micropuncture needle, using the anterior wall technique. A guide wire was now introduced through the needle, and the needle was then exchanged over the guide wire for a 7F sheath. The guide wire was removed and the RF probe was then placed into the left great saphenous vein through the sheath and position confirmed using ultrasound guidance. After the RF probe position was verified by ultrasound, tumescent anesthesia consisting of normal saline, 1% lidocaine with 8.4% sodium bicarbonate was infiltrated, under ultrasound guidance, precisely into the perivenous compartment along the entire length of the vein until a “halo” of fluid was noted around the vein. After RF probe position was again confirmed with ultrasound imaging, RF energy was applied. The probe was gradually and carefully withdrawn at a rate of 6.5cm/20seconds. \par \par 7 cycles of RF performed using the 7 cm probe\par Total treatment time was 140 seconds.\par The total volume injected was 350 cc\par Treatment length was 39 cm and \par The probe is 3.7 cm from the SFJ.\par \par Estimated Blood Loss: minimal\par Repeat ultrasound of the treated vein was performed confirming successful treatment. The catheter and sheath were withdrawn and hemostasis established with direct pressure. After assuring hemostasis, a sterile 4x4 was placed on the access site and an ACE compression wrap was applied. Patient tolerated procedure well. Patient was given post-procedure instructions and follow up appointment was scheduled.\par \par \par

## 2022-10-04 ENCOUNTER — APPOINTMENT (OUTPATIENT)
Dept: VASCULAR SURGERY | Facility: CLINIC | Age: 72
End: 2022-10-04

## 2022-10-04 PROCEDURE — 93971 EXTREMITY STUDY: CPT

## 2022-11-09 ENCOUNTER — APPOINTMENT (OUTPATIENT)
Dept: VASCULAR SURGERY | Facility: CLINIC | Age: 72
End: 2022-11-09

## 2022-11-09 PROCEDURE — 99441: CPT | Mod: 95

## 2022-11-09 NOTE — ASSESSMENT
[Arterial/Venous Disease] : arterial/venous disease [Other: _____] : [unfilled] [FreeTextEntry1] : Impression  symptomatic venous insuff w clinical improvement \par \par \par Plan Med Conservative management leg elevation, knee high compression stockings 15-20mm Hg, wt loss, diet control, exercise program, protective measures\par rto in 2 mo to re eval\par Telephonic visit  time duration  6min\par \par \par \par \par \par Varicose veins are enlarged, twisted veins. Varicose veins are caused by increased blood pressure in the veins.  The blood moves towards the heart by 1-way valves in the veins. When the valves become weakened or damaged, blood can collect in the veins and pool in your lower legs (ankles). This causes the veins to become enlarged and incompetent with reflux. Sitting or standing for long periods can cause blood to pool in the leg veins, increasing the pressure within the veins. \par Risk factors for varicose veins or venous disease may include:  obesity, older age, standing or sitting for prolonged periods of time for several years, being female, pregnancy, taking oral contraceptive pills or hormone replacement, being inactive, and/or smoking. \par The most common symptoms of varicose veins are sensations in the legs, such as a heavy feeling, burning, and/or aching. However, each individual may experience symptoms differently.  Other symptoms may include:  color changes in the skin, sores on the legs, or rash.  Severe varicose veins or venous disease may eventually produce long-term mild swelling that can result in more serious skin and tissue problems, such as ulcers and non-healing sores.\par Varicose veins and venous disease are diagnosed by a complete medical history, physical examination, and diagnostic studies for varicose veins including duplex ultrasound and color-flow imaging.  \par Medical treatment for varicose veins and venous disease include:  compression stockings, sclerotherapy, endovenous ablation and/or surgical treatment with microphlebectomy.  \par \par \par

## 2022-11-09 NOTE — HISTORY OF PRESENT ILLNESS
[FreeTextEntry1] : Pt c/o bilateral leg pain swelling heaviness and discomfort worse w activity and by the end of the day\par Onset sev years ago \par Intensity  mod to severe\par Pt denies recent injury, travel or thrombophilic event\par \par Pt is s/p jame le venous insuff interventions  by Dr KRISTINA Duff MD previously\par pt is not complaint w comp stockings\par \par Pt denies any recent  cardiac c/o , SOB, Chest Pain or recent heart attacks \par \par \par \par \par  [de-identified] : pt is s/p lt gsv  \par pt is compliant w comp stockings\par pt states some  soreness from procedure remains

## 2022-11-09 NOTE — PHYSICAL EXAM
[Alert] : alert [Oriented to Person] : oriented to person [Oriented to Place] : oriented to place [Oriented to Time] : oriented to time [Calm] : calm [Abdomen Masses] : No abdominal masses [de-identified] : no resp distress [FreeTextEntry1] : Physical exam findings via telephonic review with patient \par \par  [de-identified] : cooperative

## 2022-11-09 NOTE — REASON FOR VISIT
[Home] : at home, [unfilled] , at the time of the visit. [Medical Office: ([facility])___] : at the medical office located in  [Other:____] : [unfilled] [Verbal consent obtained from patient] : the patient, [unfilled] [FreeTextEntry1] : my legs feel better

## 2023-01-10 ENCOUNTER — APPOINTMENT (OUTPATIENT)
Dept: VASCULAR SURGERY | Facility: CLINIC | Age: 73
End: 2023-01-10
Payer: MEDICARE

## 2023-01-10 VITALS
TEMPERATURE: 97.9 F | BODY MASS INDEX: 33.13 KG/M2 | HEART RATE: 90 BPM | HEIGHT: 62 IN | DIASTOLIC BLOOD PRESSURE: 77 MMHG | WEIGHT: 180 LBS | SYSTOLIC BLOOD PRESSURE: 113 MMHG

## 2023-01-10 DIAGNOSIS — I83.893 VARICOSE VEINS OF BILATERAL LOWER EXTREMITIES WITH OTHER COMPLICATIONS: ICD-10-CM

## 2023-01-10 DIAGNOSIS — I87.2 VENOUS INSUFFICIENCY (CHRONIC) (PERIPHERAL): ICD-10-CM

## 2023-01-10 PROCEDURE — 99214 OFFICE O/P EST MOD 30 MIN: CPT

## 2023-01-10 PROCEDURE — 93970 EXTREMITY STUDY: CPT

## 2023-01-10 NOTE — HISTORY OF PRESENT ILLNESS
[FreeTextEntry1] : Pt c/o bilateral leg pain swelling heaviness and discomfort worse w activity and by the end of the day\par Onset sev years ago \par Intensity  mod to severe\par Pt denies recent injury, travel or thrombophilic event\par \par Pt is s/p jame le venous insuff interventions  by Dr KRISTINA Duff MD previously\par pt is not complaint w comp stockings\par \par Pt denies any recent  cardiac c/o , SOB, Chest Pain or recent heart attacks \par \par \par \par \par  [de-identified] : pt is s/p lt gsv  \par pt is compliant w comp stockings\par pt states ongoing jame leg  swelling and discomfort

## 2023-01-10 NOTE — DATA REVIEWED
[FreeTextEntry1] : 9/19/2022 Venous Doppler Benigno LE  no acute dvt svt \par                            RLE GSV insuff from sfj to knee  level sig for small caliber \par                            LLE GSV insuff from sfj  to distal calf level w insuff thigh and calf collaterals\par \par \par 1/10/2023 Venous Doppler Benigno LE  no acute dvt svt \par                           LLE GSV  successfully ablated \par

## 2023-01-10 NOTE — PHYSICAL EXAM
[1+] : left 1+ [0] : right 0 [2+] : left 2+ [Alert] : alert [Oriented to Person] : oriented to person [Oriented to Place] : oriented to place [Oriented to Time] : oriented to time [Calm] : calm [JVD] : no jugular venous distention  [Ankle Swelling (On Exam)] : present [Ankle Swelling Bilaterally] : bilaterally  [Varicose Veins Of Lower Extremities] : bilaterally [Ankle Swelling On The Right] : mild [] : bilaterally [Ankle Swelling On The Left] : moderate [Abdomen Masses] : No abdominal masses [de-identified] : nad [de-identified] : wnl [de-identified] : no resp distress [FreeTextEntry1] : Moderate bilateral leg venous insufficiency \par w moderate bilateral leg stasis dermatitis, hyperpigmentation in the gator area,hyperkeratosis (skin thickening)\par and mild to moderate bilateral leg edema \par Multiple  bilateral leg small to medium varicose  veins and spider veins  ant  medial   thigh calf and shin \par RLE Varicose veins measuring 1-4  mm in size on the  thigh /calf /shin \par LLE Varicose veins measuring  1-4 mm in size  on thigh/ calf /shin \par no wounds/ulcers\par  [de-identified] : wnl [de-identified] : Benigno Cranial nerves 2-12 benigno grossly intact [de-identified] : cooperative

## 2023-01-10 NOTE — ASSESSMENT
[Arterial/Venous Disease] : arterial/venous disease [Other: _____] : [unfilled] [FreeTextEntry1] : Impression  symptomatic venous insuff w clinical improvement \par \par \par Plan Med Conservative management leg elevation, knee high compression stockings 15-20mm Hg, wt loss, diet control, exercise program, protective measures\par indications risks and benefits  of  jame le sclero d/w pt \par pt to decide \par telehealth  visit in 2-3 mo to re eval \par \par \par \par \par Varicose veins are enlarged, twisted veins. Varicose veins are caused by increased blood pressure in the veins.  The blood moves towards the heart by 1-way valves in the veins. When the valves become weakened or damaged, blood can collect in the veins and pool in your lower legs (ankles). This causes the veins to become enlarged and incompetent with reflux. Sitting or standing for long periods can cause blood to pool in the leg veins, increasing the pressure within the veins. \par Risk factors for varicose veins or venous disease may include:  obesity, older age, standing or sitting for prolonged periods of time for several years, being female, pregnancy, taking oral contraceptive pills or hormone replacement, being inactive, and/or smoking. \par The most common symptoms of varicose veins are sensations in the legs, such as a heavy feeling, burning, and/or aching. However, each individual may experience symptoms differently.  Other symptoms may include:  color changes in the skin, sores on the legs, or rash.  Severe varicose veins or venous disease may eventually produce long-term mild swelling that can result in more serious skin and tissue problems, such as ulcers and non-healing sores.\par Varicose veins and venous disease are diagnosed by a complete medical history, physical examination, and diagnostic studies for varicose veins including duplex ultrasound and color-flow imaging.  \par Medical treatment for varicose veins and venous disease include:  compression stockings, sclerotherapy, endovenous ablation and/or surgical treatment with microphlebectomy.  \par \par \par

## 2023-04-12 ENCOUNTER — APPOINTMENT (OUTPATIENT)
Dept: VASCULAR SURGERY | Facility: CLINIC | Age: 73
End: 2023-04-12

## 2023-07-26 NOTE — ED ADULT NURSE NOTE - [facility] ED NUR SYMPTOMS NEG
Contacted patient to review.   Discussed with the patient medication change. Patient was previously on Metoprolol  daily and it was decreased due to bradycardia. Patient is concerned with increased dose. Patient was advised to start taking 100 mg daily check her bps and Hrs for a week. Contact clinic and will discuss based on readings if patient is ok to increase dose to BID. Discussed hold parameters.       In regards to ongoing dizziness, patient was advised to reach out ENT for a further consult appt. Additionally, patient was encouraged to reach out to PCP to see if they could place a referral to PT for vertigo or if ENT needs to be seen first.       Patient informed of results and recommendations. Karma verbalizes understanding and is agreeable. No additional questions at this time.         no back pain/no chills/no congestion/no diaphoresis/no dizziness/no fever/no nausea/no shortness of breath/no syncope/no vomiting

## 2025-01-07 NOTE — ED PROVIDER NOTE - CHILD ABUSE FACILITY
thereof.   [] Q7.      Class B Findings (2 needed)   1. [] Absent posterior tibial pulse   2. [] Absent dorsalis pedis pulse   3. [] Advanced trophic changes; three of the following are required:   ·         [] hair growth (decrease or absence)   ·         [] nail changes (thickening)   ·         [] pigmentary changes (discoloration)   ·         [] skin texture (thin, shiny)   ·         [] skin color (rubor or redness)   [] Q8.      Class C Findings (1 Class B, 2 Class C needed)   1. [] Claudication   2. [] Temperature changes   3. [] Edema   4. [] Paresthesia   5. [] Burning   [] Q9.     NO CLASS FINDINGS ARE NOTED    ASSESSMENT:    Diagnosis Orders   1. Onychomycosis of toenail  DEBRIDEMENT OF NAILS, 6 OR MORE      2. Corns and callosities  TRIM HYPERKERATOTIC SKIN LESION, ONE      3. Pain of toes of both feet  DEBRIDEMENT OF NAILS, 6 OR MORE      4. Pain in toe of right foot  TRIM HYPERKERATOTIC SKIN LESION, ONE        PLAN: Toenails 1,2,3,4,5 of the right foot and 1,2,3,4,5 of the left foot were debrided in length and thickness using a nail nipper and a .  The lesion(s) to the right foot debrided with a 15 blade without event.  Return in about 9 weeks (around 3/10/2025) for Painful fungal nails, Painful callous(es).   1/6/2025      Raheem Hatch DPM    [facility]